# Patient Record
Sex: FEMALE | Race: WHITE | NOT HISPANIC OR LATINO
[De-identification: names, ages, dates, MRNs, and addresses within clinical notes are randomized per-mention and may not be internally consistent; named-entity substitution may affect disease eponyms.]

---

## 2019-01-15 ENCOUNTER — RX RENEWAL (OUTPATIENT)
Age: 44
End: 2019-01-15

## 2019-01-16 ENCOUNTER — RX RENEWAL (OUTPATIENT)
Age: 44
End: 2019-01-16

## 2019-03-19 ENCOUNTER — RECORD ABSTRACTING (OUTPATIENT)
Age: 44
End: 2019-03-19

## 2019-03-19 DIAGNOSIS — Z78.9 OTHER SPECIFIED HEALTH STATUS: ICD-10-CM

## 2019-03-19 DIAGNOSIS — Z87.891 PERSONAL HISTORY OF NICOTINE DEPENDENCE: ICD-10-CM

## 2019-03-20 ENCOUNTER — APPOINTMENT (OUTPATIENT)
Dept: ENDOCRINOLOGY | Facility: CLINIC | Age: 44
End: 2019-03-20
Payer: COMMERCIAL

## 2019-03-20 VITALS
BODY MASS INDEX: 34.36 KG/M2 | DIASTOLIC BLOOD PRESSURE: 62 MMHG | HEIGHT: 61 IN | HEART RATE: 109 BPM | SYSTOLIC BLOOD PRESSURE: 112 MMHG | WEIGHT: 182 LBS | OXYGEN SATURATION: 98 %

## 2019-03-20 PROCEDURE — 99213 OFFICE O/P EST LOW 20 MIN: CPT

## 2019-03-20 NOTE — PHYSICAL EXAM
[Alert] : alert [No Acute Distress] : no acute distress [Well Nourished] : well nourished [Well Developed] : well developed [Normal Sclera/Conjunctiva] : normal sclera/conjunctiva [EOMI] : extra ocular movement intact [Normal Hearing] : hearing was normal [Supple] : the neck was supple [No LAD] : no lymphadenopathy [Thyroid Not Enlarged] : the thyroid was not enlarged [No Thyroid Nodules] : there were no palpable thyroid nodules [No Accessory Muscle Use] : no accessory muscle use [Normal Rate and Effort] : normal respiratory rhythm and effort [Normal Rate] : heart rate was normal  [Clear to Auscultation] : lungs were clear to auscultation bilaterally [Regular Rhythm] : with a regular rhythm [Normal S1, S2] : normal S1 and S2 [Normal Bowel Sounds] : normal bowel sounds [Soft] : abdomen soft [Not Tender] : non-tender [Normal Gait] : normal gait [Acanthosis Nigricans] : no acanthosis nigricans [No Motor Deficits] : the motor exam was normal [No Tremors] : no tremors [Oriented x3] : oriented to person, place, and time [Normal Mood] : the mood was normal [Normal Insight/Judgement] : insight and judgment were intact

## 2019-03-20 NOTE — ASSESSMENT
[Importance of Diet and Exercise] : importance of diet and exercise to improve glycemic control, achieve weight loss and improve cardiovascular health [FreeTextEntry1] : 44 y/o female with PCOS.\par \par Plan:\par PCOS: labs now, check A1C, lipids, hormone levels incuding testosterone, cbc, cmp, TFTs\par - educated on healthy food choices\par - encouraged to increase routine exercise\par - information given for Saxenda - for weight loss\par - schedule appointment with CDE for diet education \par - follow up in 6 months

## 2019-03-20 NOTE — REVIEW OF SYSTEMS
[Fatigue] : fatigue [Recent Weight Gain (___ Lbs)] : recent [unfilled] ~Ulb weight gain [Blurry Vision] : blurred vision [Hair Loss] : hair loss [Anxiety] : anxiety [Heat Intolerance] : heat intolerant [Decreased Appetite] : appetite not decreased [Visual Field Defect] : no visual field defect [Dysphonia] : no dysphonia [Dysphagia] : no dysphagia [Chest Pain] : no chest pain [Neck Pain] : no neck pain [Constipation] : no constipation [Palpitations] : no palpitations [Diarrhea] : no diarrhea [Polyuria] : no polyuria [Dysuria] : no dysuria [Dry Skin] : no dry skin [Headache] : no headaches [Depression] : no depression [Tremors] : no tremors [Polydipsia] : no polydipsia [Cold Intolerance] : cold tolerant [Swelling] : no swelling [Easy Bruising] : no tendency for easy bruising [de-identified] : at times [FreeTextEntry3] : right eye, will schedule appointment with ophthalmology

## 2019-09-13 LAB
GLUCOSE SERPL-MCNC: 104
HBA1C MFR BLD HPLC: 5.7
LDLC SERPL DIRECT ASSAY-MCNC: 165

## 2019-09-16 ENCOUNTER — APPOINTMENT (OUTPATIENT)
Dept: ENDOCRINOLOGY | Facility: CLINIC | Age: 44
End: 2019-09-16
Payer: COMMERCIAL

## 2019-09-16 VITALS
BODY MASS INDEX: 34.93 KG/M2 | DIASTOLIC BLOOD PRESSURE: 64 MMHG | WEIGHT: 185 LBS | HEART RATE: 110 BPM | HEIGHT: 61 IN | SYSTOLIC BLOOD PRESSURE: 124 MMHG

## 2019-09-16 PROCEDURE — 99214 OFFICE O/P EST MOD 30 MIN: CPT

## 2019-09-16 RX ORDER — METFORMIN ER 500 MG 500 MG/1
500 TABLET ORAL
Qty: 90 | Refills: 0 | Status: DISCONTINUED | COMMUNITY
Start: 2019-01-16 | End: 2019-09-16

## 2019-09-16 NOTE — ASSESSMENT
[FreeTextEntry1] : 43 y/o female with PCOS, Prediabetes, and Hyperlipidemia. \par \par Plan:\par PCOS: \par - continue Metformin \par - information given for Victoza - for weight loss\par - schedule appointment with CDE for diet education \par \par Prediabetes: check A1C \par - educated on healthy food choices\par - encouraged to increase routine exercise\par \par Hyperlipidemia: check lipids, based on results will start on Lipitor 10 mg daily \par \par - follow up visit in 6 months  [Importance of Diet and Exercise] : importance of diet and exercise to improve glycemic control, achieve weight loss and improve cardiovascular health

## 2019-09-16 NOTE — REVIEW OF SYSTEMS
[Fatigue] : fatigue [Recent Weight Gain (___ Lbs)] : recent [unfilled] ~Ulb weight gain [Neck Pain] : neck pain [Anxiety] : anxiety [Heat Intolerance] : heat intolerant [Decreased Appetite] : appetite not decreased [Visual Field Defect] : no visual field defect [Blurry Vision] : no blurred vision [Dysphagia] : no dysphagia [Dysphonia] : no dysphonia [Chest Pain] : no chest pain [Palpitations] : no palpitations [Constipation] : no constipation [Diarrhea] : no diarrhea [Polyuria] : no polyuria [Dysuria] : no dysuria [Headache] : no headaches [Tremors] : no tremors [Polydipsia] : no polydipsia [Depression] : no depression [Easy Bruising] : no tendency for easy bruising [Cold Intolerance] : cold tolerant [Swelling] : no swelling [FreeTextEntry4] : chronic posterior right side of neck

## 2019-09-16 NOTE — PHYSICAL EXAM
[No Acute Distress] : no acute distress [Alert] : alert [Well Nourished] : well nourished [Well Developed] : well developed [Normal Sclera/Conjunctiva] : normal sclera/conjunctiva [EOMI] : extra ocular movement intact [Normal Hearing] : hearing was normal [Supple] : the neck was supple [No LAD] : no lymphadenopathy [Thyroid Not Enlarged] : the thyroid was not enlarged [No Thyroid Nodules] : there were no palpable thyroid nodules [Normal Rate and Effort] : normal respiratory rhythm and effort [No Accessory Muscle Use] : no accessory muscle use [Clear to Auscultation] : lungs were clear to auscultation bilaterally [Normal Rate] : heart rate was normal  [Normal S1, S2] : normal S1 and S2 [Regular Rhythm] : with a regular rhythm [Normal Bowel Sounds] : normal bowel sounds [Not Tender] : non-tender [Soft] : abdomen soft [Normal Gait] : normal gait [Acanthosis Nigricans] : no acanthosis nigricans [No Motor Deficits] : the motor exam was normal [No Tremors] : no tremors [Oriented x3] : oriented to person, place, and time [Normal Insight/Judgement] : insight and judgment were intact [Normal Mood] : the mood was normal

## 2019-10-22 ENCOUNTER — RX RENEWAL (OUTPATIENT)
Age: 44
End: 2019-10-22

## 2019-10-29 ENCOUNTER — RESULT REVIEW (OUTPATIENT)
Age: 44
End: 2019-10-29

## 2019-10-29 ENCOUNTER — MEDICATION RENEWAL (OUTPATIENT)
Age: 44
End: 2019-10-29

## 2020-01-22 ENCOUNTER — RX RENEWAL (OUTPATIENT)
Age: 45
End: 2020-01-22

## 2020-03-16 ENCOUNTER — APPOINTMENT (OUTPATIENT)
Dept: ENDOCRINOLOGY | Facility: CLINIC | Age: 45
End: 2020-03-16
Payer: COMMERCIAL

## 2020-03-16 VITALS
BODY MASS INDEX: 35.12 KG/M2 | OXYGEN SATURATION: 97 % | HEART RATE: 117 BPM | SYSTOLIC BLOOD PRESSURE: 120 MMHG | DIASTOLIC BLOOD PRESSURE: 80 MMHG | HEIGHT: 61 IN | WEIGHT: 186 LBS

## 2020-03-16 DIAGNOSIS — Z79.899 OTHER LONG TERM (CURRENT) DRUG THERAPY: ICD-10-CM

## 2020-03-16 LAB
HBA1C MFR BLD HPLC: 5.9
LDLC SERPL DIRECT ASSAY-MCNC: 89

## 2020-03-16 PROCEDURE — 99214 OFFICE O/P EST MOD 30 MIN: CPT

## 2020-03-16 NOTE — REVIEW OF SYSTEMS
[Fatigue] : fatigue [Anxiety] : anxiety [Hot Flashes] : hot flashes [Decreased Appetite] : appetite not decreased [Recent Weight Gain (___ Lbs)] : no recent weight gain [Recent Weight Loss (___ Lbs)] : no recent weight loss [Visual Field Defect] : no visual field defect [Blurry Vision] : no blurred vision [Dysphagia] : no dysphagia [Dysphonia] : no dysphonia [Neck Pain] : no neck pain [Chest Pain] : no chest pain [Palpitations] : no palpitations [Constipation] : no constipation [Diarrhea] : no diarrhea [Polyuria] : no polyuria [Dysuria] : no dysuria [Headache] : no headaches [Tremors] : no tremors [Depression] : no depression [Polydipsia] : no polydipsia [Cold Intolerance] : cold tolerant [Heat Intolerance] : heat tolerant [Easy Bruising] : no tendency for easy bruising [Swelling] : no swelling [FreeTextEntry2] : weight stable  [de-identified] : over the last 5 years

## 2020-03-16 NOTE — PHYSICAL EXAM
[Alert] : alert [No Acute Distress] : no acute distress [Well Nourished] : well nourished [Well Developed] : well developed [Normal Sclera/Conjunctiva] : normal sclera/conjunctiva [EOMI] : extra ocular movement intact [Supple] : the neck was supple [No LAD] : no lymphadenopathy [Thyroid Not Enlarged] : the thyroid was not enlarged [No Thyroid Nodules] : there were no palpable thyroid nodules [Normal Rate and Effort] : normal respiratory rhythm and effort [No Accessory Muscle Use] : no accessory muscle use [Clear to Auscultation] : lungs were clear to auscultation bilaterally [Normal Rate] : heart rate was normal  [Normal S1, S2] : normal S1 and S2 [Regular Rhythm] : with a regular rhythm [Normal Bowel Sounds] : normal bowel sounds [Not Tender] : non-tender [Soft] : abdomen soft [Normal Gait] : normal gait [Acanthosis Nigricans] : no acanthosis nigricans [No Motor Deficits] : the motor exam was normal [No Tremors] : no tremors [Oriented x3] : oriented to person, place, and time [Normal Insight/Judgement] : insight and judgment were intact [Normal Mood] : the mood was normal

## 2020-03-16 NOTE — REASON FOR VISIT
[Follow-Up: _____] : a [unfilled] follow-up visit [FreeTextEntry1] : PCOS, Prediabetes, and Hyperlipidemia. \par PCOS, Prediabetes, and Hyperlipidemia. \par  PCOS, Prediabetes, and Hyperlipidemia

## 2020-03-16 NOTE — ASSESSMENT
[FreeTextEntry1] : 45 y/o female with PCOS, Prediabetes, and Hyperlipidemia. \par \par Plan:\par PCOS: \par - continue Metformin \par - schedule appointment with CDE for diet education \par \par Prediabetes: \par - educated on healthy food choices\par - encouraged to increase routine exercise\par - check A1C in 6 months \par \par Hyperlipidemia: improving, continue Lipitor \par - check lipids in 6 months \par \par - follow up visit in 6 months  [Importance of Diet and Exercise] : importance of diet and exercise to improve glycemic control, achieve weight loss and improve cardiovascular health

## 2020-03-16 NOTE — HISTORY OF PRESENT ILLNESS
[FreeTextEntry1] : Quality: PCOS\par Severity: moderate\par Duration: over 3 years\par Onset: irregulare menses, weight gain\par Modifying Factors: Better with medication \par Associated Symptoms: weight gain, thinning hair\par \par Notes:\par LMP 3/3/20 - menses regular every 38-42 days, no pregnancy history\par exercise: none, will start walking in the Spring \par \par Current Regimen:\par Metformin  mg daily\par \par Labs reviewed: A1C 5.9\par \par Prediabetes: lifestyle modifications along with Metformin \par

## 2020-09-09 ENCOUNTER — RX RENEWAL (OUTPATIENT)
Age: 45
End: 2020-09-09

## 2020-09-21 ENCOUNTER — APPOINTMENT (OUTPATIENT)
Dept: ENDOCRINOLOGY | Facility: CLINIC | Age: 45
End: 2020-09-21
Payer: COMMERCIAL

## 2020-09-21 VITALS
SYSTOLIC BLOOD PRESSURE: 132 MMHG | DIASTOLIC BLOOD PRESSURE: 78 MMHG | HEIGHT: 61 IN | OXYGEN SATURATION: 97 % | HEART RATE: 87 BPM | WEIGHT: 186 LBS | BODY MASS INDEX: 35.12 KG/M2

## 2020-09-21 PROCEDURE — 99214 OFFICE O/P EST MOD 30 MIN: CPT

## 2020-09-21 NOTE — PHYSICAL EXAM
[Alert] : alert [Well Nourished] : well nourished [No Acute Distress] : no acute distress [Well Developed] : well developed [Normal Sclera/Conjunctiva] : normal sclera/conjunctiva [EOMI] : extra ocular movement intact [No LAD] : no lymphadenopathy [Thyroid Not Enlarged] : the thyroid was not enlarged [No Thyroid Nodules] : no palpable thyroid nodules [No Respiratory Distress] : no respiratory distress [Normal Rate and Effort] : normal respiratory rate and effort [Clear to Auscultation] : lungs were clear to auscultation bilaterally [Normal S1, S2] : normal S1 and S2 [Normal Rate] : heart rate was normal [Regular Rhythm] : with a regular rhythm [No Edema] : no peripheral edema [Normal Bowel Sounds] : normal bowel sounds [Soft] : abdomen soft [Normal Gait] : normal gait [No Rash] : no rash [No Tremors] : no tremors [Oriented x3] : oriented to person, place, and time [Normal Insight/Judgement] : insight and judgment were intact [Normal Mood] : the mood was normal [Acanthosis Nigricans] : no acanthosis nigricans [de-identified] : obese

## 2020-09-21 NOTE — HISTORY OF PRESENT ILLNESS
[FreeTextEntry1] : Quality: PCOS\par Severity: moderate\par Duration: over 3 years\par Onset: irregulare menses, weight gain\par Modifying Factors: Better with medication \par Associated Symptoms: weight gain, thinning hair\par \par Notes:\par LMP 9/12/20 - menses regular every 38-42 days, no pregnancy history\par exercise: started walking 4-5 days a week  \par \par Current Regimen:\par Metformin  mg daily\par \par Labs reviewed: A1C 6.0\par \par Prediabetes: lifestyle modifications along with Metformin \par

## 2020-09-21 NOTE — ASSESSMENT
[FreeTextEntry1] : 44 y/o female with PCOS, Prediabetes, and Hyperlipidemia. \par \par Plan:\par PCOS: \par - continue Metformin, if A1C does not improve then will increase Metformin to 2 tabs daily, pt. will increase exercise and modify diet \par - schedule appointment with CDE for diet education \par \par Prediabetes: \par - educated on healthy food choices\par - increase routine exercise\par - check A1C in 6 months \par \par Hyperlipidemia: controlled continue Lipitor \par Triglycerides are slightly elevated - follow a low fat diet \par - check lipids in 6 months \par \par - follow up visit in 6 months

## 2020-09-21 NOTE — REVIEW OF SYSTEMS
[Anxiety] : anxiety [Heat Intolerance] : heat intolerance [Fatigue] : no fatigue [Decreased Appetite] : appetite not decreased [Recent Weight Gain (___ Lbs)] : no recent weight gain [Recent Weight Loss (___ Lbs)] : no recent weight loss [Visual Field Defect] : no visual field defect [Blurred Vision] : no blurred vision [Chest Pain] : no chest pain [Palpitations] : no palpitations [Constipation] : no constipation [Diarrhea] : no diarrhea [Polyuria] : no polyuria [Dysuria] : no dysuria [Headaches] : no headaches [Tremors] : no tremors [Depression] : no depression [Polydipsia] : no polydipsia [Cold Intolerance] : no cold intolerance [Swelling] : no swelling [FreeTextEntry2] : weight stable

## 2020-10-30 ENCOUNTER — APPOINTMENT (OUTPATIENT)
Dept: ENDOCRINOLOGY | Facility: CLINIC | Age: 45
End: 2020-10-30
Payer: COMMERCIAL

## 2020-10-30 PROCEDURE — 99072 ADDL SUPL MATRL&STAF TM PHE: CPT

## 2020-10-30 PROCEDURE — 97802 MEDICAL NUTRITION INDIV IN: CPT

## 2020-11-10 LAB — HBA1C MFR BLD HPLC: 6

## 2021-03-12 LAB — HBA1C MFR BLD HPLC: 5.8

## 2021-03-15 ENCOUNTER — APPOINTMENT (OUTPATIENT)
Dept: ENDOCRINOLOGY | Facility: CLINIC | Age: 46
End: 2021-03-15
Payer: COMMERCIAL

## 2021-03-15 VITALS
WEIGHT: 186 LBS | HEIGHT: 61 IN | DIASTOLIC BLOOD PRESSURE: 72 MMHG | BODY MASS INDEX: 35.12 KG/M2 | OXYGEN SATURATION: 95 % | SYSTOLIC BLOOD PRESSURE: 108 MMHG | HEART RATE: 111 BPM

## 2021-03-15 PROCEDURE — 99214 OFFICE O/P EST MOD 30 MIN: CPT

## 2021-03-15 PROCEDURE — 99072 ADDL SUPL MATRL&STAF TM PHE: CPT

## 2021-03-15 RX ORDER — PSYLLIUM HUSK 0.4 G
CAPSULE ORAL
Refills: 0 | Status: ACTIVE | COMMUNITY

## 2021-03-15 RX ORDER — ASCORBIC ACID 500 MG
TABLET ORAL
Refills: 0 | Status: ACTIVE | COMMUNITY

## 2021-03-15 NOTE — HISTORY OF PRESENT ILLNESS
[FreeTextEntry1] : Quality: PCOS\par Severity: moderate\par Duration: over 3 years\par Onset: irregulare menses, weight gain\par Modifying Factors: Better with medication \par Associated Symptoms: weight gain, thinning hair\par \par Notes:\par LMP 3/5/21 - menses regular every 38-42 days, no pregnancy history\par exercise: started walking 4-5 days a week, slower with colder weather   \par diet: has made some modifications in diet but continues to have some sweets \par \par Current Regimen:\par Metformin  mg daily\par \par Labs reviewed: A1C 5.8\par \par Prediabetes: lifestyle modifications along with Metformin \par \par Seeing Cardiology in April due to heart burn

## 2021-03-15 NOTE — PHYSICAL EXAM
[Alert] : alert [Well Nourished] : well nourished [No Acute Distress] : no acute distress [Well Developed] : well developed [Normal Sclera/Conjunctiva] : normal sclera/conjunctiva [EOMI] : extra ocular movement intact [No LAD] : no lymphadenopathy [Thyroid Not Enlarged] : the thyroid was not enlarged [No Thyroid Nodules] : no palpable thyroid nodules [No Respiratory Distress] : no respiratory distress [Normal Rate and Effort] : normal respiratory rate and effort [Clear to Auscultation] : lungs were clear to auscultation bilaterally [Normal S1, S2] : normal S1 and S2 [Normal Rate] : heart rate was normal [Regular Rhythm] : with a regular rhythm [Normal Bowel Sounds] : normal bowel sounds [Soft] : abdomen soft [Normal Gait] : normal gait [No Rash] : no rash [Acanthosis Nigricans] : no acanthosis nigricans [No Tremors] : no tremors [Oriented x3] : oriented to person, place, and time [Normal Insight/Judgement] : insight and judgment were intact [Normal Mood] : the mood was normal [de-identified] : right ankle with trace of edema  [de-identified] : obese

## 2021-03-15 NOTE — REVIEW OF SYSTEMS
[Heartburn] : heartburn [Anxiety] : anxiety [Fatigue] : no fatigue [Decreased Appetite] : appetite not decreased [Recent Weight Gain (___ Lbs)] : no recent weight gain [Recent Weight Loss (___ Lbs)] : no recent weight loss [Visual Field Defect] : no visual field defect [Blurred Vision] : no blurred vision [Dysphagia] : no dysphagia [Neck Pain] : no neck pain [Dysphonia] : no dysphonia [Chest Pain] : no chest pain [Palpitations] : no palpitations [Constipation] : no constipation [Diarrhea] : no diarrhea [Polyuria] : no polyuria [Dysuria] : no dysuria [Headaches] : no headaches [Tremors] : no tremors [Depression] : no depression [Polydipsia] : no polydipsia [FreeTextEntry2] : weight stable

## 2021-03-15 NOTE — ASSESSMENT
[FreeTextEntry1] : 46 y/o female with PCOS, Prediabetes, and Hyperlipidemia. \par \par Plan:\par PCOS: \par - continue Metformin\par - lifestyle modifications\par \par Prediabetes: A1C improved \par - educated on healthy food choices\par - increase routine exercise\par - check A1C in 6 months \par \par Hyperlipidemia: check lipids now, continue Lipitor \par - continue to follow a low fat diet \par \par Educated on avoiding late night snacking to avoid heartburn in the evening. \par \par RTO in 6 months  [Importance of Diet and Exercise] : importance of diet and exercise to improve glycemic control, achieve weight loss and improve cardiovascular health

## 2021-04-14 ENCOUNTER — NON-APPOINTMENT (OUTPATIENT)
Age: 46
End: 2021-04-14

## 2021-09-13 ENCOUNTER — RX RENEWAL (OUTPATIENT)
Age: 46
End: 2021-09-13

## 2021-09-15 LAB
HBA1C MFR BLD HPLC: 5.9
LDLC SERPL DIRECT ASSAY-MCNC: 117

## 2021-09-16 ENCOUNTER — APPOINTMENT (OUTPATIENT)
Dept: ENDOCRINOLOGY | Facility: CLINIC | Age: 46
End: 2021-09-16
Payer: COMMERCIAL

## 2021-09-16 VITALS
WEIGHT: 192 LBS | HEART RATE: 96 BPM | BODY MASS INDEX: 36.25 KG/M2 | OXYGEN SATURATION: 98 % | DIASTOLIC BLOOD PRESSURE: 62 MMHG | HEIGHT: 61 IN | SYSTOLIC BLOOD PRESSURE: 110 MMHG

## 2021-09-16 PROCEDURE — 99214 OFFICE O/P EST MOD 30 MIN: CPT

## 2021-09-16 NOTE — REVIEW OF SYSTEMS
[Fatigue] : fatigue [Recent Weight Gain (___ Lbs)] : recent weight gain: [unfilled] lbs [Nausea] : nausea [Dysuria] : dysuria [Irregular Menses] : irregular menses [Joint Pain] : joint pain [Hair Loss] : hair loss [Anxiety] : anxiety [Heat Intolerance] : heat intolerance [Decreased Appetite] : appetite not decreased [Fever] : no fever [Chills] : no chills [Eye Pain] : no pain [Dysphagia] : no dysphagia [Neck Pain] : no neck pain [Dysphonia] : no dysphonia [Chest Pain] : no chest pain [Palpitations] : no palpitations [Shortness Of Breath] : no shortness of breath [Cough] : no cough [Constipation] : no constipation [Vomiting] : no vomiting [Diarrhea] : no diarrhea [Polyuria] : no polyuria [Muscle Weakness] : no muscle weakness [Dry Skin] : no dry skin [Headaches] : no headaches [Tremors] : no tremors [Polydipsia] : no polydipsia [Cold Intolerance] : no cold intolerance [FreeTextEntry3] : Seeing opthamologist next month. 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

## 2021-09-16 NOTE — PHYSICAL EXAM
[Alert] : alert [Well Nourished] : well nourished [No Acute Distress] : no acute distress [Normal Sclera/Conjunctiva] : normal sclera/conjunctiva [Well Developed] : well developed [EOMI] : extra ocular movement intact [No LAD] : no lymphadenopathy [Thyroid Not Enlarged] : the thyroid was not enlarged [No Thyroid Nodules] : no palpable thyroid nodules [Normal Rate and Effort] : normal respiratory rate and effort [No Respiratory Distress] : no respiratory distress [Clear to Auscultation] : lungs were clear to auscultation bilaterally [Normal S1, S2] : normal S1 and S2 [Normal Rate] : heart rate was normal [Regular Rhythm] : with a regular rhythm [No Edema] : no peripheral edema [Normal Bowel Sounds] : normal bowel sounds [Soft] : abdomen soft [Normal Gait] : normal gait [No Rash] : no rash [Acanthosis Nigricans] : no acanthosis nigricans [No Tremors] : no tremors [Oriented x3] : oriented to person, place, and time [Normal Affect] : the affect was normal [Normal Insight/Judgement] : insight and judgment were intact [Normal Mood] : the mood was normal [de-identified] : obese

## 2021-09-16 NOTE — HISTORY OF PRESENT ILLNESS
[FreeTextEntry1] : Quality: PCOS\par Severity: moderate\par Duration: over 3 years\par Onset: irregulare menses, weight gain\par Modifying Factors: Better with medication \par Associated Symptoms: weight gain, thinning hair\par \par Notes:\par LMP  - menses regular every 28-43 days, no pregnancy history. LMP 9/4/21, before that 8/6/21, 7/2/21, 6/1/21, 5/28/21\par exercise: started walking 4-5 days a week, slower with colder weather   \par diet: has made some modifications in diet but continues to have some sweets \par \par Current Regimen:\par Metformin  mg daily\par \par Labs reviewed: A1C 5.9\par \par Prediabetes: lifestyle modifications along with Metformin \par \par Seeing Cardiology in April due to heart burn

## 2021-09-16 NOTE — ASSESSMENT
[FreeTextEntry1] : 45 y/o female with PCOS, Prediabetes, and Hyperlipidemia. \par \par Plan:\par PCOS: \par - Increase Metformin to 500mg 2x daily \par -continue lifestyle modifications\par \par Prediabetes: A1C slightly increased with weight gain\par - educated on healthy food choices\par - increase routine exercise\par - check A1C in 6 months \par \par Hyperlipidemia: Triglycerides and LDL are elevated, continue Lipitor \par - continue to follow a low fat diet \par - repeat lipids in 6 months \par \par Premenopause symptoms: r/o menopause, check hormones \par \par RTO in 6 months  [Importance of Diet and Exercise] : importance of diet and exercise to improve glycemic control, achieve weight loss and improve cardiovascular health

## 2021-12-05 ENCOUNTER — RX RENEWAL (OUTPATIENT)
Age: 46
End: 2021-12-05

## 2022-01-06 ENCOUNTER — APPOINTMENT (OUTPATIENT)
Dept: FAMILY MEDICINE | Facility: CLINIC | Age: 47
End: 2022-01-06
Payer: COMMERCIAL

## 2022-01-06 VITALS
WEIGHT: 196 LBS | SYSTOLIC BLOOD PRESSURE: 130 MMHG | OXYGEN SATURATION: 97 % | BODY MASS INDEX: 37 KG/M2 | TEMPERATURE: 97.5 F | HEIGHT: 61 IN | DIASTOLIC BLOOD PRESSURE: 90 MMHG

## 2022-01-06 DIAGNOSIS — M25.562 PAIN IN RIGHT KNEE: ICD-10-CM

## 2022-01-06 DIAGNOSIS — M25.561 PAIN IN RIGHT KNEE: ICD-10-CM

## 2022-01-06 PROCEDURE — 99214 OFFICE O/P EST MOD 30 MIN: CPT

## 2022-01-06 NOTE — PLAN
[FreeTextEntry1] : 46-year-old female presents for renewal of rescue inhaler\par Asthma–intermittent asthma worse with URI symptoms and change of weather.  Her insurance company was giving her a hard time about acceptable choices.  She is prescribed albuterol Prem dose inhaler 2 puffs 4 times a day as needed with refills on examination today her lungs are clear to PNA\par Bilateral knee pain–she complains of pain constantly especially when waking up in the morning.  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  She is advised of the benefits of weight loss\par Type IV hyperlipidemia–high triglycerides she is placed on increasing doses of atorvastatin and is concerned that this may be exacerbating knee pain she is advised that the arthralgia of statin use is generally a more generalized form

## 2022-01-06 NOTE — HEALTH RISK ASSESSMENT
[0] : 2) Feeling down, depressed, or hopeless: Not at all (0) [PHQ-2 Negative - No further assessment needed] : PHQ-2 Negative - No further assessment needed [[id number]] : 0

## 2022-01-06 NOTE — HISTORY OF PRESENT ILLNESS
[FreeTextEntry1] : Medication renewal [de-identified] : 46-year-old female with a history of intermittent asthma presents for a covered Prem dose inhaler for breakthrough\par History of PCOS with prediabetes\par Complaining of bilateral knee pain\par History of type IV hyperlipidemia

## 2022-02-22 ENCOUNTER — RX RENEWAL (OUTPATIENT)
Age: 47
End: 2022-02-22

## 2022-03-16 ENCOUNTER — APPOINTMENT (OUTPATIENT)
Dept: ENDOCRINOLOGY | Facility: CLINIC | Age: 47
End: 2022-03-16
Payer: COMMERCIAL

## 2022-03-16 VITALS
WEIGHT: 196 LBS | OXYGEN SATURATION: 97 % | HEIGHT: 61 IN | DIASTOLIC BLOOD PRESSURE: 82 MMHG | SYSTOLIC BLOOD PRESSURE: 122 MMHG | HEART RATE: 107 BPM | BODY MASS INDEX: 37 KG/M2

## 2022-03-16 PROCEDURE — 99214 OFFICE O/P EST MOD 30 MIN: CPT

## 2022-03-16 NOTE — REVIEW OF SYSTEMS
[Fatigue] : fatigue [Neck Pain] : neck pain [Acne] : acne [Dry Skin] : dry skin [Hirsutism] : hirsutism [Anxiety] : anxiety [Hot Flashes] : hot flashes [Decreased Appetite] : appetite not decreased [Recent Weight Gain (___ Lbs)] : no recent weight gain [Recent Weight Loss (___ Lbs)] : no recent weight loss [Visual Field Defect] : no visual field defect [Blurred Vision] : no blurred vision [Dysphagia] : no dysphagia [Dysphonia] : no dysphonia [Chest Pain] : no chest pain [Palpitations] : no palpitations [Constipation] : no constipation [Diarrhea] : no diarrhea [Polyuria] : no polyuria [Dysuria] : no dysuria [Headaches] : no headaches [Tremors] : no tremors [Depression] : no depression [Polydipsia] : no polydipsia [Cold Intolerance] : no cold intolerance [Heat Intolerance] : no heat intolerance [Swelling] : no swelling [FreeTextEntry2] : stable  [FreeTextEntry4] : posterior neck pain from computer strain

## 2022-03-16 NOTE — ASSESSMENT
[Importance of Diet and Exercise] : importance of diet and exercise to improve glycemic control, achieve weight loss and improve cardiovascular health [FreeTextEntry1] : 47 y/o female with PCOS, Prediabetes, and Hyperlipidemia. \par \par Plan:\par PCOS: \par - Increase Metformin to 500mg 3x daily \par -continue lifestyle modifications\par \par Prediabetes: A1C slightly increased due to diet \par - educated on healthy food choices\par - increase routine exercise\par - check A1C before next visit \par \par Hyperlipidemia: Triglycerides and LDL are elevated, continue with recent increase Atorvastatin 20 mg daily  \par - continue to follow a low fat diet \par - repeat lipids before next visit \par \par Irregular menses: follow up with gyn \par \par Vitamin D Deficiency: resolved, start OTC Vitamin D 1,000 units daily \par - repeat levels before next visit \par \par RTO in 6 months with Dr. Walden

## 2022-03-16 NOTE — HISTORY OF PRESENT ILLNESS
[FreeTextEntry1] : Quality: PCOS\par Severity: moderate\par Duration: over 3 years\par Onset: irregulare menses, weight gain\par Modifying Factors: Better with medication \par Associated Symptoms: weight gain, thinning hair\par \par Notes:\par LMP  - menses regular every 28-43 days, no pregnancy history. LMP 2/5-2/9, 8 days late with menses,  had a vasectomy, no pregnancies concerns \par exercise: will start walking 4-5 days a week, slower with colder weather   \par diet: has made some modifications in diet but continues to have some carbs at times\par \par Current Regimen:\par Metformin  mg 2 tabs once a day\par \par Labs reviewed: A1C 6.2\par \par Prediabetes: lifestyle modifications along with Metformin \par \par Seen Cardiology 3 months - increased Atorvastatin to 20 mg daily

## 2022-03-16 NOTE — PHYSICAL EXAM
[Alert] : alert [Well Nourished] : well nourished [No Acute Distress] : no acute distress [Well Developed] : well developed [Normal Sclera/Conjunctiva] : normal sclera/conjunctiva [No Proptosis] : no proptosis [No LAD] : no lymphadenopathy [Thyroid Not Enlarged] : the thyroid was not enlarged [No Thyroid Nodules] : no palpable thyroid nodules [No Respiratory Distress] : no respiratory distress [Normal Rate and Effort] : normal respiratory rate and effort [Clear to Auscultation] : lungs were clear to auscultation bilaterally [Normal S1, S2] : normal S1 and S2 [Normal Rate] : heart rate was normal [Regular Rhythm] : with a regular rhythm [No Edema] : no peripheral edema [Normal Bowel Sounds] : normal bowel sounds [Soft] : abdomen soft [Normal Gait] : normal gait [No Rash] : no rash [Acanthosis Nigricans] : no acanthosis nigricans [No Tremors] : no tremors [Oriented x3] : oriented to person, place, and time [Normal Affect] : the affect was normal [Normal Insight/Judgement] : insight and judgment were intact [Normal Mood] : the mood was normal [de-identified] : obese

## 2022-05-19 ENCOUNTER — RX RENEWAL (OUTPATIENT)
Age: 47
End: 2022-05-19

## 2022-09-09 LAB
HBA1C MFR BLD HPLC: 6.1
LDLC SERPL DIRECT ASSAY-MCNC: 92

## 2022-09-11 ENCOUNTER — RX RENEWAL (OUTPATIENT)
Age: 47
End: 2022-09-11

## 2022-09-12 ENCOUNTER — APPOINTMENT (OUTPATIENT)
Dept: ENDOCRINOLOGY | Facility: CLINIC | Age: 47
End: 2022-09-12

## 2022-09-12 VITALS
SYSTOLIC BLOOD PRESSURE: 124 MMHG | HEART RATE: 103 BPM | BODY MASS INDEX: 36.25 KG/M2 | OXYGEN SATURATION: 98 % | HEIGHT: 61 IN | WEIGHT: 192 LBS | DIASTOLIC BLOOD PRESSURE: 80 MMHG

## 2022-09-12 PROCEDURE — 99214 OFFICE O/P EST MOD 30 MIN: CPT

## 2022-09-12 RX ORDER — BACILLUS COAGULANS/INULIN 1B-250 MG
CAPSULE ORAL
Refills: 0 | Status: DISCONTINUED | COMMUNITY
End: 2022-09-12

## 2022-09-12 RX ORDER — ATORVASTATIN CALCIUM 10 MG/1
10 TABLET, FILM COATED ORAL
Qty: 90 | Refills: 1 | Status: DISCONTINUED | COMMUNITY
Start: 2019-10-29 | End: 2022-09-12

## 2022-09-12 RX ORDER — ATORVASTATIN CALCIUM 20 MG/1
20 TABLET, FILM COATED ORAL
Qty: 90 | Refills: 0 | Status: ACTIVE | COMMUNITY
Start: 2021-11-11

## 2022-09-12 RX ORDER — PNV NO.95/FERROUS FUM/FOLIC AC 28MG-0.8MG
TABLET ORAL
Refills: 0 | Status: ACTIVE | COMMUNITY

## 2022-09-12 RX ORDER — ADHESIVE TAPE 3"X 2.3 YD
50 MCG TAPE, NON-MEDICATED TOPICAL
Refills: 0 | Status: ACTIVE | COMMUNITY

## 2022-09-12 RX ORDER — ERGOCALCIFEROL 1.25 MG/1
1.25 MG CAPSULE, LIQUID FILLED ORAL
Qty: 12 | Refills: 0 | Status: DISCONTINUED | COMMUNITY
Start: 2019-10-22 | End: 2022-09-12

## 2022-09-12 NOTE — REVIEW OF SYSTEMS
[Anxiety] : anxiety [Fatigue] : no fatigue [Decreased Appetite] : appetite not decreased [Recent Weight Gain (___ Lbs)] : no recent weight gain [Recent Weight Loss (___ Lbs)] : no recent weight loss [Visual Field Defect] : no visual field defect [Blurred Vision] : no blurred vision [Dysphagia] : no dysphagia [Neck Pain] : no neck pain [Dysphonia] : no dysphonia [Chest Pain] : no chest pain [Palpitations] : no palpitations [Constipation] : no constipation [Diarrhea] : no diarrhea [Polyuria] : no polyuria [Dysuria] : no dysuria [Headaches] : no headaches [Tremors] : no tremors [Depression] : no depression [Polydipsia] : no polydipsia [Swelling] : no swelling [FreeTextEntry2] : weight stable

## 2022-09-12 NOTE — PHYSICAL EXAM
[Alert] : alert [Well Nourished] : well nourished [No Acute Distress] : no acute distress [Well Developed] : well developed [Normal Sclera/Conjunctiva] : normal sclera/conjunctiva [No Proptosis] : no proptosis [Normal Hearing] : hearing was normal [No LAD] : no lymphadenopathy [Thyroid Not Enlarged] : the thyroid was not enlarged [No Thyroid Nodules] : no palpable thyroid nodules [No Respiratory Distress] : no respiratory distress [Normal Rate and Effort] : normal respiratory rate and effort [Clear to Auscultation] : lungs were clear to auscultation bilaterally [Normal S1, S2] : normal S1 and S2 [Normal Rate] : heart rate was normal [Regular Rhythm] : with a regular rhythm [No Edema] : no peripheral edema [Normal Bowel Sounds] : normal bowel sounds [Soft] : abdomen soft [Normal Gait] : normal gait [No Rash] : no rash [Acanthosis Nigricans] : no acanthosis nigricans [No Tremors] : no tremors [Oriented x3] : oriented to person, place, and time [Normal Affect] : the affect was normal [Normal Insight/Judgement] : insight and judgment were intact [Normal Mood] : the mood was normal [de-identified] : obese

## 2022-09-12 NOTE — HISTORY OF PRESENT ILLNESS
[FreeTextEntry1] : Patient is scheduled for surgery on October 6, for supracervical hysterectomy with bilateral salpingectomy due to three fibroids at [facility]. \par \par Quality: PCOS\par Severity: moderate\par Duration: over 3 years\par Onset: irregulare menses, weight gain\par Modifying Factors: Better with medication \par Associated Symptoms: weight gain, thinning hair\par \par Notes:\par LMP  - menses irregular every 28-43 days, no pregnancy history. LMP 9/3, late with menses and no menses in August.  had a vasectomy, no pregnancies concerns.  \par exercise: will start walking 4-5 days a week, slower with colder weather   \par diet: has made some modifications in diet but continues to have some carbs at times\par \par Current Regimen:\par Metformin  mg 3 tabs once a day\par \par Labs reviewed: A1C 6.1\par \par Prediabetes: lifestyle modifications along with Metformin \par \par Seen Cardiology every 6 months - on Atorvastatin to 20 mg daily

## 2022-09-12 NOTE — ASSESSMENT
[FreeTextEntry1] : 46 y/o female with PCOS, Prediabetes, and Hyperlipidemia. \par \par Plan:\par PCOS: \par -Continue Metformin 500mg 3x daily - stop Metformin before surgery and restart medication once able to tolerate a regular diet \par -continue lifestyle modifications\par \par Prediabetes: A1C slightly increased due to diet \par - educated on healthy food choices\par - increase routine exercise\par - check A1C before next visit \par \par Hyperlipidemia: Triglycerides and LDL improved, continue Atorvastatin 20 mg daily  \par - continue to follow a low fat diet \par - repeat lipids before next visit \par \par Irregular menses: follow up with gyn, continue with plan for surgery to remove fibroids.\par \par Vitamin D Deficiency: resolved, continue OTC Vitamin D 2,000 units daily \par - repeat levels before next visit \par \par RTO in 6 months with Dr. Walden  [Importance of Diet and Exercise] : importance of diet and exercise to improve glycemic control, achieve weight loss and improve cardiovascular health

## 2022-09-29 ENCOUNTER — APPOINTMENT (OUTPATIENT)
Dept: FAMILY MEDICINE | Facility: CLINIC | Age: 47
End: 2022-09-29

## 2022-09-29 VITALS
HEART RATE: 106 BPM | DIASTOLIC BLOOD PRESSURE: 90 MMHG | HEIGHT: 61 IN | BODY MASS INDEX: 35.87 KG/M2 | SYSTOLIC BLOOD PRESSURE: 142 MMHG | OXYGEN SATURATION: 98 % | TEMPERATURE: 97.2 F | WEIGHT: 190 LBS

## 2022-09-29 DIAGNOSIS — D25.9 LEIOMYOMA OF UTERUS, UNSPECIFIED: ICD-10-CM

## 2022-09-29 PROCEDURE — 99214 OFFICE O/P EST MOD 30 MIN: CPT

## 2022-09-29 NOTE — HISTORY OF PRESENT ILLNESS
[No Pertinent Cardiac History] : no history of aortic stenosis, atrial fibrillation, coronary artery disease, recent myocardial infarction, or implantable device/pacemaker [Asthma] : asthma [No Adverse Anesthesia Reaction] : no adverse anesthesia reaction in self or family member [(Patient denies any chest pain, claudication, dyspnea on exertion, orthopnea, palpitations or syncope)] : Patient denies any chest pain, claudication, dyspnea on exertion, orthopnea, palpitations or syncope [Chronic Anticoagulation] : no chronic anticoagulation [Chronic Kidney Disease] : no chronic kidney disease [Diabetes] : no diabetes [FreeTextEntry1] : hysterectomy and b/l salping  [FreeTextEntry2] : 10/6/22 [FreeTextEntry3] : dr chawla  [FreeTextEntry4] : 48 yo fem with fibroids on uterus \par pcos\par metformin \par lipitor \par tob remote  etoh soc \par

## 2022-09-29 NOTE — PLAN
[FreeTextEntry1] : 47-year-old female presents for preoperative medical clearance\par Fibroid uterus–3 large fibroids with resultant symptomatology back pain for surgical intervention\par Polycystic ovarian syndrome–on metformin prediabetic status labs to be reviewed\par Asthma–as needed rescue inhaler albuterol has been prescribed she rarely uses the puffer\par Hyperlipidemia–atorvastatin 10 mg daily that medication is reviewed\par This is a remote tobacco user note tobacco use in over 5 years\par Rare alcohol user.\par Lab work is reviewed and is unremarkable hemoglobin A1c 6.1\par EKG shows evidence of sinus tachycardia consistent with today's pulse rate of 106\par Blood pressure 142/90\par No prior issues with anesthesia .\par This female is medically cleared for surgical intervention\par

## 2023-03-01 ENCOUNTER — APPOINTMENT (OUTPATIENT)
Dept: FAMILY MEDICINE | Facility: CLINIC | Age: 48
End: 2023-03-01
Payer: COMMERCIAL

## 2023-03-01 VITALS
SYSTOLIC BLOOD PRESSURE: 120 MMHG | OXYGEN SATURATION: 99 % | HEART RATE: 118 BPM | WEIGHT: 192 LBS | TEMPERATURE: 97.4 F | BODY MASS INDEX: 36.25 KG/M2 | DIASTOLIC BLOOD PRESSURE: 80 MMHG | RESPIRATION RATE: 15 BRPM | HEIGHT: 61 IN

## 2023-03-01 DIAGNOSIS — L02.214 CUTANEOUS ABSCESS OF GROIN: ICD-10-CM

## 2023-03-01 PROCEDURE — 99214 OFFICE O/P EST MOD 30 MIN: CPT

## 2023-03-01 NOTE — HEALTH RISK ASSESSMENT
[0] : 2) Feeling down, depressed, or hopeless: Not at all (0) [PHQ-2 Negative - No further assessment needed] : PHQ-2 Negative - No further assessment needed [[id number]] : 0

## 2023-03-01 NOTE — HISTORY OF PRESENT ILLNESS
[FreeTextEntry8] : reports  painful abscess right groin region\par \par has had similar episode a few years ago

## 2023-03-01 NOTE — PHYSICAL EXAM
[Normal] : no respiratory distress, lungs were clear to auscultation bilaterally and no accessory muscle use [de-identified] : +  palpable abscess  2x 2  cm. below right groin

## 2023-03-17 LAB — HBA1C MFR BLD HPLC: 6

## 2023-03-20 ENCOUNTER — APPOINTMENT (OUTPATIENT)
Dept: ENDOCRINOLOGY | Facility: CLINIC | Age: 48
End: 2023-03-20
Payer: COMMERCIAL

## 2023-03-20 VITALS
HEART RATE: 118 BPM | DIASTOLIC BLOOD PRESSURE: 80 MMHG | WEIGHT: 190 LBS | HEIGHT: 61 IN | OXYGEN SATURATION: 98 % | SYSTOLIC BLOOD PRESSURE: 120 MMHG | BODY MASS INDEX: 35.87 KG/M2

## 2023-03-20 PROCEDURE — 99214 OFFICE O/P EST MOD 30 MIN: CPT

## 2023-03-20 RX ORDER — AMOXICILLIN AND CLAVULANATE POTASSIUM 875; 125 MG/1; MG/1
875-125 TABLET, COATED ORAL
Qty: 20 | Refills: 0 | Status: DISCONTINUED | COMMUNITY
Start: 2023-03-01 | End: 2023-03-20

## 2023-03-20 NOTE — ASSESSMENT
[Importance of Diet and Exercise] : importance of diet and exercise to improve glycemic control, achieve weight loss and improve cardiovascular health [FreeTextEntry1] : no progression towards diabetes\par hyperlipidemia well controlled on atorvastatin\par \par renew metformin

## 2023-03-20 NOTE — HISTORY OF PRESENT ILLNESS
[FreeTextEntry1] : s/p MIKE, ovaries intact\par \par Quality: PCOS\par Severity: moderate\par Duration: over 4 years\par Onset: irregulare menses, weight gain\par Modifying Factors: Better with medication \par Associated Symptoms: weight gain, thinning hair\par \par Notes:\par LMP  - menses irregular every 28-43 days, no pregnancy history. LMP 9/3, late with menses and no menses in August.  had a vasectomy, no pregnancies concerns.  \par exercise: will start walking 4-5 days a week, slower with colder weather   \par diet: has made some modifications in diet but continues to have some carbs at times\par \par Current Regimen:\par Metformin  mg 3 tabs once a day\par \par Labs reviewed: A1C 6.0\par \par Prediabetes: lifestyle modifications along with Metformin \par \par Seen Cardiology every 6 months - on Atorvastatin to 20 mg daily

## 2023-07-31 ENCOUNTER — APPOINTMENT (OUTPATIENT)
Dept: FAMILY MEDICINE | Facility: CLINIC | Age: 48
End: 2023-07-31
Payer: COMMERCIAL

## 2023-07-31 VITALS
TEMPERATURE: 97.8 F | RESPIRATION RATE: 17 BRPM | DIASTOLIC BLOOD PRESSURE: 80 MMHG | HEIGHT: 61 IN | SYSTOLIC BLOOD PRESSURE: 130 MMHG | BODY MASS INDEX: 36.06 KG/M2 | OXYGEN SATURATION: 97 % | HEART RATE: 107 BPM | WEIGHT: 191 LBS

## 2023-07-31 DIAGNOSIS — Z00.00 ENCOUNTER FOR GENERAL ADULT MEDICAL EXAMINATION W/OUT ABNORMAL FINDINGS: ICD-10-CM

## 2023-07-31 DIAGNOSIS — E55.9 VITAMIN D DEFICIENCY, UNSPECIFIED: ICD-10-CM

## 2023-07-31 DIAGNOSIS — B37.31 ACUTE CANDIDIASIS OF VULVA AND VAGINA: ICD-10-CM

## 2023-07-31 PROCEDURE — 99396 PREV VISIT EST AGE 40-64: CPT | Mod: 25

## 2023-07-31 PROCEDURE — G0444 DEPRESSION SCREEN ANNUAL: CPT | Mod: 59

## 2023-07-31 NOTE — PLAN
[FreeTextEntry1] : Vaginal yeast infection- prescribed diflucan 200mg 1 tablet now and 2nd tablet in 72 hours if needed.  PCOS/Prediabetes- follows with endo, taking Metformin ER 500mg TID. Last A1C at 6.0  Hypertriglyceridemia/tachycardia- follows with Cardio, taking atorvastatin 20mg daily.   Breast Ca screening- mammogram ordered- written prescription.  She is currently looking for a new derm/gyn.   Fasting labs from earlier this year scanned into chart.

## 2023-07-31 NOTE — HISTORY OF PRESENT ILLNESS
[FreeTextEntry1] : annual wellness [de-identified] : Patient presents for annual wellness exam as suggested by her insurance.  She complains of vaginal itching, no discharge at this time. She sees cardiology and endocrinology.  Recently had labs completed- scanned into chart.

## 2023-07-31 NOTE — HEALTH RISK ASSESSMENT
[Very Good] : ~his/her~  mood as very good [Never (0 pts)] : Never (0 points) [No] : In the past 12 months have you used drugs other than those required for medical reasons? No [No falls in past year] : Patient reported no falls in the past year [Little interest or pleasure doing things] : 1) Little interest or pleasure doing things [Feeling down, depressed, or hopeless] : 2) Feeling down, depressed, or hopeless [0] : 2) Feeling down, depressed, or hopeless: Not at all (0) [PHQ-2 Negative - No further assessment needed] : PHQ-2 Negative - No further assessment needed [Employed] : employed [15-19] : 15-19 [FreeTextEntry1] : vaginal itching [Fully functional (bathing, dressing, toileting, transferring, walking, feeding)] : Fully functional (bathing, dressing, toileting, transferring, walking, feeding) [Fully functional (using the telephone, shopping, preparing meals, housekeeping, doing laundry, using] : Fully functional and needs no help or supervision to perform IADLs (using the telephone, shopping, preparing meals, housekeeping, doing laundry, using transportation, managing medications and managing finances) [Former] : Former [10-14] : 10-14 [< 15 Years] : < 15 Years [[id number]] : 0 [Reports changes in hearing] : Reports no changes in hearing [Reports changes in vision] : Reports no changes in vision

## 2023-08-01 ENCOUNTER — RESULT CHARGE (OUTPATIENT)
Age: 48
End: 2023-08-01

## 2023-08-01 PROBLEM — Z00.00 PHYSICAL EXAM: Status: ACTIVE | Noted: 2023-08-01

## 2023-08-01 LAB
BILIRUB UR QL STRIP: NEGATIVE
CLARITY UR: CLEAR
COLLECTION METHOD: NORMAL
GLUCOSE UR-MCNC: NEGATIVE
HCG UR QL: 0.2 EU/DL
HGB UR QL STRIP.AUTO: NORMAL
KETONES UR-MCNC: NEGATIVE
LEUKOCYTE ESTERASE UR QL STRIP: NEGATIVE
NITRITE UR QL STRIP: NEGATIVE
PH UR STRIP: 6
PROT UR STRIP-MCNC: NEGATIVE
SP GR UR STRIP: >=1.03

## 2023-09-15 LAB — HBA1C MFR BLD HPLC: 6.1

## 2023-09-18 ENCOUNTER — APPOINTMENT (OUTPATIENT)
Dept: ENDOCRINOLOGY | Facility: CLINIC | Age: 48
End: 2023-09-18
Payer: COMMERCIAL

## 2023-09-18 VITALS
DIASTOLIC BLOOD PRESSURE: 70 MMHG | WEIGHT: 185 LBS | BODY MASS INDEX: 34.93 KG/M2 | OXYGEN SATURATION: 99 % | SYSTOLIC BLOOD PRESSURE: 110 MMHG | HEART RATE: 107 BPM | HEIGHT: 61 IN

## 2023-09-18 PROCEDURE — 99214 OFFICE O/P EST MOD 30 MIN: CPT

## 2023-09-18 RX ORDER — AMOXICILLIN 875 MG/1
875 TABLET, FILM COATED ORAL
Qty: 14 | Refills: 0 | Status: DISCONTINUED | COMMUNITY
Start: 2023-06-10

## 2023-09-18 RX ORDER — CHLORHEXIDINE GLUCONATE, 0.12% ORAL RINSE 1.2 MG/ML
0.12 SOLUTION DENTAL
Qty: 473 | Refills: 0 | Status: ACTIVE | COMMUNITY
Start: 2023-06-10

## 2023-09-18 RX ORDER — ASPIRIN 81 MG/1
81 TABLET, COATED ORAL
Qty: 86 | Refills: 0 | Status: ACTIVE | COMMUNITY
Start: 2023-06-01

## 2024-03-19 ENCOUNTER — APPOINTMENT (OUTPATIENT)
Dept: ENDOCRINOLOGY | Facility: CLINIC | Age: 49
End: 2024-03-19

## 2024-03-25 LAB
HBA1C MFR BLD HPLC: 6.1
LDLC SERPL CALC-MCNC: 89
TSH SERPL-ACNC: 2.45

## 2024-03-26 ENCOUNTER — APPOINTMENT (OUTPATIENT)
Dept: ENDOCRINOLOGY | Facility: CLINIC | Age: 49
End: 2024-03-26
Payer: COMMERCIAL

## 2024-03-26 DIAGNOSIS — E28.2 POLYCYSTIC OVARIAN SYNDROME: ICD-10-CM

## 2024-03-26 PROCEDURE — 99214 OFFICE O/P EST MOD 30 MIN: CPT

## 2024-03-26 RX ORDER — MUPIROCIN 20 MG/G
2 OINTMENT TOPICAL TWICE DAILY
Qty: 1 | Refills: 2 | Status: DISCONTINUED | COMMUNITY
Start: 2023-03-01 | End: 2024-03-26

## 2024-03-26 RX ORDER — FLUCONAZOLE 200 MG/1
200 TABLET ORAL
Qty: 2 | Refills: 1 | Status: DISCONTINUED | COMMUNITY
Start: 2023-07-31 | End: 2024-03-26

## 2024-03-26 RX ORDER — TIRZEPATIDE 2.5 MG/.5ML
2.5 INJECTION, SOLUTION SUBCUTANEOUS
Qty: 1 | Refills: 1 | Status: ACTIVE | COMMUNITY
Start: 2024-03-26 | End: 1900-01-01

## 2024-03-26 RX ORDER — ASPIRIN 81 MG
81 TABLET, DELAYED RELEASE (ENTERIC COATED) ORAL
Refills: 0 | Status: DISCONTINUED | COMMUNITY
End: 2024-03-26

## 2024-03-26 NOTE — HISTORY OF PRESENT ILLNESS
[FreeTextEntry1] : s/p MIKE, ovaries intact C/o unable to lose weight No changes since last visit.   Quality: PCOS Severity: moderate Duration: over 4 years Onset: irregular menses, weight gain Modifying Factors: Better with medication Associated Symptoms: weight gain, thinning hair  Notes: LMP - s/p hysterectomy, still has ovaries.  had a vasectomy, no pregnancies concerns. exercise: will start walking 4-5 days a week, slower with colder weather diet: has made some modifications in diet but continues to have some carbs at times  Current Regimen: Metformin  mg 4 tabs once a day  Labs reviewed: A1C 6.1  Prediabetes: lifestyle modifications along with Metformin  Seen Cardiology every 6 months - on Atorvastatin 20 mg daily  quit smoking in 2016

## 2024-03-26 NOTE — PHYSICAL EXAM
[Alert] : alert [No Acute Distress] : no acute distress [de-identified] : Physical exam deferred due to Telehealth visit

## 2024-03-26 NOTE — REVIEW OF SYSTEMS
[Anxiety] : anxiety [Fatigue] : no fatigue [Decreased Appetite] : appetite not decreased [Recent Weight Gain (___ Lbs)] : no recent weight gain [Recent Weight Loss (___ Lbs)] : no recent weight loss [Visual Field Defect] : no visual field defect [Blurred Vision] : no blurred vision [Dysphagia] : no dysphagia [Neck Pain] : no neck pain [Dysphonia] : no dysphonia [Chest Pain] : no chest pain [Palpitations] : no palpitations [Constipation] : no constipation [Diarrhea] : no diarrhea [Polyuria] : no polyuria [Dysuria] : no dysuria [Headaches] : no headaches [Tremors] : no tremors [Depression] : no depression [Polydipsia] : no polydipsia [FreeTextEntry2] : weight stable

## 2024-03-26 NOTE — REASON FOR VISIT
[Other Location: e.g. School (Enter Location, City,State)___] : at [unfilled], at the time of the visit. [Other Location: e.g. Home (Enter Location, City,State)___] : at [unfilled] [Patient] : the patient [Self] : self [Follow - Up] : a follow-up visit [Hypothyroidism] : hypothyroidism

## 2024-03-26 NOTE — ASSESSMENT
[FreeTextEntry1] : PCOS/Prediabetes/Obesity: no progression towards diabetes, start  Zepbound 2.5 mg weekly then if able to tolerate in 4 weeks increase to 5 mg for weight loss and diabetes prevention, reviewed s/e, denies hx of pancreatitis and/or Thyroid Medullary Cancer. If starts Zepbound, decrease Metformin to once a day. Continue to modify diet and exercise regimen. Repeat A1c before next visit.  Hyperlipidemia well controlled on atorvastatin. Repeat lipids before next visit. Continue to follow up with Cardiology.   RTO in 6 months with Dr. Walden.  Overall spent 30 with patient and documenting.

## 2024-05-19 ENCOUNTER — RX RENEWAL (OUTPATIENT)
Age: 49
End: 2024-05-19

## 2024-05-19 RX ORDER — METFORMIN ER 500 MG 500 MG/1
500 TABLET ORAL
Qty: 270 | Refills: 0 | Status: ACTIVE | COMMUNITY
Start: 2019-03-20 | End: 1900-01-01

## 2024-06-18 ENCOUNTER — RESULT CHARGE (OUTPATIENT)
Age: 49
End: 2024-06-18

## 2024-06-19 ENCOUNTER — APPOINTMENT (OUTPATIENT)
Dept: FAMILY MEDICINE | Facility: CLINIC | Age: 49
End: 2024-06-19
Payer: COMMERCIAL

## 2024-06-19 VITALS
OXYGEN SATURATION: 96 % | HEIGHT: 61 IN | SYSTOLIC BLOOD PRESSURE: 138 MMHG | TEMPERATURE: 97.9 F | DIASTOLIC BLOOD PRESSURE: 70 MMHG | BODY MASS INDEX: 36.89 KG/M2 | RESPIRATION RATE: 16 BRPM | WEIGHT: 195.38 LBS | HEART RATE: 102 BPM

## 2024-06-19 DIAGNOSIS — Z12.11 ENCOUNTER FOR SCREENING FOR MALIGNANT NEOPLASM OF COLON: ICD-10-CM

## 2024-06-19 DIAGNOSIS — E78.5 HYPERLIPIDEMIA, UNSPECIFIED: ICD-10-CM

## 2024-06-19 DIAGNOSIS — E66.9 OBESITY, UNSPECIFIED: ICD-10-CM

## 2024-06-19 DIAGNOSIS — J45.909 UNSPECIFIED ASTHMA, UNCOMPLICATED: ICD-10-CM

## 2024-06-19 DIAGNOSIS — R73.03 PREDIABETES.: ICD-10-CM

## 2024-06-19 DIAGNOSIS — Z00.00 ENCOUNTER FOR GENERAL ADULT MEDICAL EXAMINATION W/OUT ABNORMAL FINDINGS: ICD-10-CM

## 2024-06-19 DIAGNOSIS — Z12.39 ENCOUNTER FOR OTHER SCREENING FOR MALIGNANT NEOPLASM OF BREAST: ICD-10-CM

## 2024-06-19 PROCEDURE — 99396 PREV VISIT EST AGE 40-64: CPT

## 2024-06-19 PROCEDURE — 81003 URINALYSIS AUTO W/O SCOPE: CPT | Mod: QW

## 2024-06-19 RX ORDER — ALBUTEROL SULFATE 90 UG/1
108 (90 BASE) INHALANT RESPIRATORY (INHALATION) 4 TIMES DAILY
Qty: 1 | Refills: 4 | Status: ACTIVE | COMMUNITY
Start: 2022-01-06 | End: 1900-01-01

## 2024-06-19 NOTE — PHYSICAL EXAM
[Normal] : no carotid or abdominal bruits heard, no varicosities, pedal pulses are present, no peripheral edema, no extremity clubbing or cyanosis and no palpable aorta [Declined Breast Exam] : declined breast exam  [de-identified] : follows with gyn every 2 years

## 2024-06-19 NOTE — HISTORY OF PRESENT ILLNESS
[FreeTextEntry1] : feeling well presents for physical family history negative for CAD  cancer  diab. quit smoking  2016 follows with cardiology and endo.

## 2024-06-19 NOTE — PLAN
[FreeTextEntry1] : rx  given for mammogram  states due in august follow up with gyn rx given for fit test  follow up with endo. and card. healthy diet  exercise

## 2024-06-20 LAB
BILIRUB UR QL STRIP: NEGATIVE
CLARITY UR: CLEAR
COLLECTION METHOD: NORMAL
GLUCOSE UR-MCNC: NEGATIVE
HCG UR QL: 1 EU/DL
HGB UR QL STRIP.AUTO: NEGATIVE
KETONES UR-MCNC: NEGATIVE
LEUKOCYTE ESTERASE UR QL STRIP: NEGATIVE
NITRITE UR QL STRIP: NEGATIVE
PH UR STRIP: 6
PROT UR STRIP-MCNC: NEGATIVE
SP GR UR STRIP: >=1.03

## 2024-09-24 ENCOUNTER — APPOINTMENT (OUTPATIENT)
Dept: ENDOCRINOLOGY | Facility: CLINIC | Age: 49
End: 2024-09-24
Payer: COMMERCIAL

## 2024-09-24 VITALS
BODY MASS INDEX: 37.19 KG/M2 | HEIGHT: 61 IN | DIASTOLIC BLOOD PRESSURE: 80 MMHG | WEIGHT: 197 LBS | OXYGEN SATURATION: 98 % | HEART RATE: 102 BPM | SYSTOLIC BLOOD PRESSURE: 126 MMHG

## 2024-09-24 DIAGNOSIS — R73.03 PREDIABETES.: ICD-10-CM

## 2024-09-24 DIAGNOSIS — E78.5 HYPERLIPIDEMIA, UNSPECIFIED: ICD-10-CM

## 2024-09-24 DIAGNOSIS — E55.9 VITAMIN D DEFICIENCY, UNSPECIFIED: ICD-10-CM

## 2024-09-24 DIAGNOSIS — R79.89 OTHER SPECIFIED ABNORMAL FINDINGS OF BLOOD CHEMISTRY: ICD-10-CM

## 2024-09-24 DIAGNOSIS — E28.2 POLYCYSTIC OVARIAN SYNDROME: ICD-10-CM

## 2024-09-24 LAB
HBA1C MFR BLD HPLC: 6.5
LDLC SERPL DIRECT ASSAY-MCNC: 94
TSH SERPL-ACNC: 1.63

## 2024-09-24 PROCEDURE — 99214 OFFICE O/P EST MOD 30 MIN: CPT

## 2024-09-24 NOTE — REVIEW OF SYSTEMS
[Fatigue] : no fatigue [Decreased Appetite] : appetite not decreased [Recent Weight Gain (___ Lbs)] : no recent weight gain [Recent Weight Loss (___ Lbs)] : no recent weight loss [Visual Field Defect] : no visual field defect [Blurred Vision] : blurred vision [Dysphagia] : no dysphagia [Neck Pain] : no neck pain [Dysphonia] : no dysphonia [Chest Pain] : no chest pain [Palpitations] : no palpitations [Constipation] : no constipation [Diarrhea] : no diarrhea [Polyuria] : no polyuria [Dysuria] : no dysuria [Headaches] : no headaches [Tremors] : no tremors [Depression] : no depression [Anxiety] : anxiety [Polydipsia] : no polydipsia [FreeTextEntry2] : weight stable [FreeTextEntry3] : seen eye doctor

## 2024-09-24 NOTE — HISTORY OF PRESENT ILLNESS
[FreeTextEntry1] : s/p MIKE, ovaries intact C/o unable to lose weight No changes since last visit.  Quality: PCOS Severity: moderate Duration: over 4 years Onset: irregular menses, weight gain Modifying Factors: Better with medication Associated Symptoms: weight gain, thinning hair  Notes: LMP - s/p hysterectomy, still has ovaries.  had a vasectomy, no pregnancies concerns. exercise: will start walking 4-5 days a week, slower with colder weather diet: has made some modifications in diet but continues to have some carbs at times  Current Regimen: Metformin  mg 4 tabs once a day Never started on zepbound due to insurance not covering medication   Labs reviewed: A1C 6.5%  Prediabetes: lifestyle modifications along with Metformin  Seen Cardiology every 6 months - on Atorvastatin 40 mg daily  quit smoking in 2016

## 2024-09-24 NOTE — ASSESSMENT
[FreeTextEntry1] : PCOS/Prediabetes/Obesity: no progression towards diabetes, start Zepbound 2.5 mg weekly then if able to tolerate in 4 weeks increase to 5 mg for weight loss and diabetes prevention, reviewed s/e, denies hx of pancreatitis and/or Thyroid Medullary Cancer. If starts Zepbound, decrease Metformin to once a day. Continue to modify diet and exercise regimen. Repeat A1c before next visit. If Zepbound not covered can try Wegovy.   Hyperlipidemia well controlled on atorvastatin. Repeat lipids before next visit. Continue to follow up with Cardiology.  Vitamin D Def; start Vitamin D OTC 5,000 units daily   ALT elevated - possible fatty liver, avoid Advil/Tylenol and alcohol, will continue monitor    RTO in 6 months with Dr. Walden.

## 2024-09-24 NOTE — PHYSICAL EXAM
[Alert] : alert [Obese] : obese [No Acute Distress] : no acute distress [Normal Sclera/Conjunctiva] : normal sclera/conjunctiva [No Proptosis] : no proptosis [No LAD] : no lymphadenopathy [Thyroid Not Enlarged] : the thyroid was not enlarged [No Thyroid Nodules] : no palpable thyroid nodules [No Respiratory Distress] : no respiratory distress [No Accessory Muscle Use] : no accessory muscle use [Normal Rate and Effort] : normal respiratory rate and effort [Clear to Auscultation] : lungs were clear to auscultation bilaterally [Normal S1, S2] : normal S1 and S2 [Normal Rate] : heart rate was normal [Regular Rhythm] : with a regular rhythm [Normal Bowel Sounds] : normal bowel sounds [Not Tender] : non-tender [Soft] : abdomen soft [No Rash] : no rash [Acanthosis Nigricans] : no acanthosis nigricans [No Tremors] : no tremors [Oriented x3] : oriented to person, place, and time [Normal Affect] : the affect was normal [Normal Insight/Judgement] : insight and judgment were intact [Normal Mood] : the mood was normal

## 2024-10-16 ENCOUNTER — APPOINTMENT (OUTPATIENT)
Dept: ENDOCRINOLOGY | Facility: CLINIC | Age: 49
End: 2024-10-16

## 2025-04-15 ENCOUNTER — APPOINTMENT (OUTPATIENT)
Dept: ENDOCRINOLOGY | Facility: CLINIC | Age: 50
End: 2025-04-15
Payer: COMMERCIAL

## 2025-04-15 ENCOUNTER — NON-APPOINTMENT (OUTPATIENT)
Age: 50
End: 2025-04-15

## 2025-04-15 VITALS
WEIGHT: 182 LBS | OXYGEN SATURATION: 99 % | BODY MASS INDEX: 34.36 KG/M2 | DIASTOLIC BLOOD PRESSURE: 78 MMHG | HEART RATE: 93 BPM | SYSTOLIC BLOOD PRESSURE: 120 MMHG | HEIGHT: 61 IN

## 2025-04-15 DIAGNOSIS — E55.9 VITAMIN D DEFICIENCY, UNSPECIFIED: ICD-10-CM

## 2025-04-15 DIAGNOSIS — E78.5 HYPERLIPIDEMIA, UNSPECIFIED: ICD-10-CM

## 2025-04-15 DIAGNOSIS — R73.03 PREDIABETES.: ICD-10-CM

## 2025-04-15 DIAGNOSIS — R79.89 OTHER SPECIFIED ABNORMAL FINDINGS OF BLOOD CHEMISTRY: ICD-10-CM

## 2025-04-15 DIAGNOSIS — E28.2 POLYCYSTIC OVARIAN SYNDROME: ICD-10-CM

## 2025-04-15 LAB
HBA1C MFR BLD HPLC: 5.7
LDLC SERPL DIRECT ASSAY-MCNC: 65
TSH SERPL-ACNC: 1.36

## 2025-04-15 PROCEDURE — 99214 OFFICE O/P EST MOD 30 MIN: CPT

## 2025-04-15 RX ORDER — METOPROLOL TARTRATE 25 MG/1
25 TABLET ORAL
Refills: 0 | Status: ACTIVE | COMMUNITY

## 2025-04-15 RX ORDER — OMEPRAZOLE 40 MG/1
CAPSULE, DELAYED RELEASE ORAL
Refills: 0 | Status: ACTIVE | COMMUNITY

## 2025-07-01 ENCOUNTER — RESULT CHARGE (OUTPATIENT)
Age: 50
End: 2025-07-01

## 2025-07-01 ENCOUNTER — APPOINTMENT (OUTPATIENT)
Dept: FAMILY MEDICINE | Facility: CLINIC | Age: 50
End: 2025-07-01
Payer: COMMERCIAL

## 2025-07-01 VITALS
DIASTOLIC BLOOD PRESSURE: 62 MMHG | HEART RATE: 103 BPM | BODY MASS INDEX: 34.36 KG/M2 | WEIGHT: 182 LBS | SYSTOLIC BLOOD PRESSURE: 124 MMHG | OXYGEN SATURATION: 98 % | TEMPERATURE: 98 F | HEIGHT: 61 IN

## 2025-07-01 PROBLEM — R31.29 MICROSCOPIC HEMATURIA: Status: ACTIVE | Noted: 2025-07-01

## 2025-07-01 PROBLEM — F12.20 MARIJUANA DEPENDENCE: Status: ACTIVE | Noted: 2025-07-01

## 2025-07-01 PROBLEM — K21.9 GERD (GASTROESOPHAGEAL REFLUX DISEASE): Status: ACTIVE | Noted: 2025-07-01

## 2025-07-01 PROBLEM — Z23 ENCOUNTER FOR IMMUNIZATION: Status: ACTIVE | Noted: 2025-07-01 | Resolved: 2025-07-15

## 2025-07-01 PROCEDURE — 90471 IMMUNIZATION ADMIN: CPT

## 2025-07-01 PROCEDURE — 81003 URINALYSIS AUTO W/O SCOPE: CPT | Mod: QW

## 2025-07-01 PROCEDURE — 99396 PREV VISIT EST AGE 40-64: CPT | Mod: 25

## 2025-07-01 PROCEDURE — 90715 TDAP VACCINE 7 YRS/> IM: CPT

## 2025-07-01 PROCEDURE — 93000 ELECTROCARDIOGRAM COMPLETE: CPT

## 2025-07-01 RX ORDER — PANTOPRAZOLE 40 MG/1
40 TABLET, DELAYED RELEASE ORAL
Qty: 30 | Refills: 0 | Status: ACTIVE | COMMUNITY
Start: 2025-07-01 | End: 1900-01-01

## 2025-07-07 LAB
BILIRUB UR QL STRIP: NEGATIVE
CLARITY UR: CLEAR
COLLECTION METHOD: NORMAL
GLUCOSE UR-MCNC: NEGATIVE
HCG UR QL: 0.2 EU/DL
HGB UR QL STRIP.AUTO: ABNORMAL
KETONES UR-MCNC: NEGATIVE
LEUKOCYTE ESTERASE UR QL STRIP: NEGATIVE
NITRITE UR QL STRIP: NEGATIVE
PH UR STRIP: 5.5
PROT UR STRIP-MCNC: NEGATIVE
SP GR UR STRIP: 1.02

## 2025-09-12 LAB
HBA1C MFR BLD HPLC: 5.3
LDLC SERPL DIRECT ASSAY-MCNC: 65
TSH SERPL-ACNC: 1.67

## 2025-09-15 ENCOUNTER — APPOINTMENT (OUTPATIENT)
Dept: ENDOCRINOLOGY | Facility: CLINIC | Age: 50
End: 2025-09-15
Payer: COMMERCIAL

## 2025-09-15 VITALS
HEART RATE: 122 BPM | BODY MASS INDEX: 33.23 KG/M2 | SYSTOLIC BLOOD PRESSURE: 118 MMHG | DIASTOLIC BLOOD PRESSURE: 60 MMHG | OXYGEN SATURATION: 97 % | WEIGHT: 176 LBS | HEIGHT: 61 IN

## 2025-09-15 DIAGNOSIS — E11.9 TYPE 2 DIABETES MELLITUS W/OUT COMPLICATIONS: ICD-10-CM

## 2025-09-15 DIAGNOSIS — E28.2 POLYCYSTIC OVARIAN SYNDROME: ICD-10-CM

## 2025-09-15 PROCEDURE — 99214 OFFICE O/P EST MOD 30 MIN: CPT

## 2025-09-15 PROCEDURE — G2211 COMPLEX E/M VISIT ADD ON: CPT | Mod: NC
